# Patient Record
Sex: MALE | Race: ASIAN
[De-identification: names, ages, dates, MRNs, and addresses within clinical notes are randomized per-mention and may not be internally consistent; named-entity substitution may affect disease eponyms.]

---

## 2021-09-27 ENCOUNTER — HOSPITAL ENCOUNTER (OUTPATIENT)
Dept: HOSPITAL 76 - EMS | Age: 21
Discharge: ANOTHER HOSPITAL | End: 2021-09-27
Payer: SELF-PAY
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
## 2021-09-27 DIAGNOSIS — S09.90XA: Primary | ICD-10-CM

## 2022-09-12 ENCOUNTER — HOSPITAL ENCOUNTER (OUTPATIENT)
Dept: HOSPITAL 76 - SC | Age: 22
Discharge: HOME | End: 2022-09-12
Attending: INTERNAL MEDICINE
Payer: COMMERCIAL

## 2022-09-12 VITALS — DIASTOLIC BLOOD PRESSURE: 68 MMHG | SYSTOLIC BLOOD PRESSURE: 118 MMHG

## 2022-09-12 DIAGNOSIS — G47.10: ICD-10-CM

## 2022-09-12 DIAGNOSIS — R06.83: Primary | ICD-10-CM

## 2022-09-12 DIAGNOSIS — R41.89: ICD-10-CM

## 2022-09-12 DIAGNOSIS — G47.00: ICD-10-CM

## 2022-09-12 DIAGNOSIS — G47.8: ICD-10-CM

## 2022-09-12 PROCEDURE — 99202 OFFICE O/P NEW SF 15 MIN: CPT

## 2022-09-12 PROCEDURE — 99212 OFFICE O/P EST SF 10 MIN: CPT

## 2022-09-12 NOTE — SLEEP CARE CONSULTATION
Information from patient questionnaire entered by Roxana Castanon.





I have reviewed and concur with the information entered by Roxana Castanon. This 

document represents the service I personally performed and the decisions made by

me, Yash Gupta MD, [facility].





History of Present Illness


Service Date and Time: 09/12/2022    1332


Reason for Visit: New patient


Chief Complaint: reports: Insomnia, Unrefreshed sleep, Snoring, Excessive 

daytime sleepiness, Frequent awakenings at night


Date of Onset: PAST 2 MONTHS 


Usual bedtime: 11PM


Time it takes to fall asleep: 15 MINUTES


Snores at night: Yes


Sleeps alone due to snoring: No


Number of times waking at night: 2-3


Reasons for waking at night: reports: Choking, Snoring, Other (UNKNOWN REASON)


Toss, Turn, or Twitch while sleeping: Yes


Recalls having dreams: No


Usually gets out of bed at: 730


Feels refreshed in the morning: No


Morning headache: No


Sleepy or fatigued during the day: Yes


Ever fallen asleep while driving: No


Takes day naps: No


Dreams during day naps: No


Prior sleep studies: No


Additional HPI information: 


I had the pleasure of seeing Mr. Cates today regarding the possibility of 

him having a sleep disorder.  As you know, he is a 21-year-old gentleman who 

complains of insomnia involving frequent awakenings during the night.  The 

patient tells me that he normally goes to bed around 11 pm, and it takes him 

approximately 15 minutes to fall asleep.  He has been told that he snores loudly

and irregularly at night.  He has never been observed to stop breathing in his 

sleep.  His bed partner can still sleep in the same bed.  He can recall waking 

up on the average of 2 - 3 times during the night.  Most of the time he wakes up

because of his own snoring, choking, and having to gasp for air.  There is a lot

of tossing and turning in his sleep.  He has somniloquy (sleep talking) but not 

somnambulism (sleep walking).  Generally, there is no recollection of dreams.  

In the morning he usually gets up out of the bed around 7:30 a.m. not feeling 

refreshed nor rested.  He usually does not have a morning headache.  During the 

day he complains of feeling occasionally sleepy and fatigued.  His score on 

[place] Sleepiness Scale is 10 out of 24.  He never has fallen asleep while 

driving nor has had any accident due to sleepiness.  He usually does not take 

naps during the day.  Upon falling asleep during the day he denies having vivid 

dreams.  He has never had sleep paralysis, experienced cataplexy or symptoms of 

restless leg syndrome.  He reports having impaired concentration during the day.








- Parasomnia Symptoms


Ever been unable to move upon waking from sleep: Yes


Walks in sleep: No


Talks in sleep: Yes


Ever acted out dreams in sleep: Yes


Ever felt weak in the knees when startled or emotional: No


Problems with memory or concentration: Yes





Subjective


Initial [place] Sleepiness Scale score: 10 (9/12/22)





Social History


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 Patient is Single and lives in . 





Have you smoked in the past 12 months: No


Alcohol use: Yes


Alcohol amount and frequency: 2 DRINKS A MONTH


Caffeine use: Yes


Caffeine amount and frequency: 1 DRINK FIVE DAYS A WEEK 





Family History


Family history of sleep disordered breathing: Yes


Family Hx Sleep Apnea: Father: Snoring





Allergies and Home Medications


Drug allergies reviewed: Yes


Home medication list reviewed: Yes





Review of Systems


Cardiovascular: denies: high blood pressure, palpitations, chest pain, irregular

heart rate or pulse, leg or foot swelling, have to sleep sitting up, other


Respiratory: denies: shortness of breath, wheeze, sputum production, chronic 

cough, other


Gastrointestinal: denies: heartburn, difficulty swallowing, nausea, vomitting, 

diarrhea, abdominal pain, other


Urinary: denies: incontinence, frequency, urgency, impotence, other


Neurological: reports: headaches


Psychiatric: denies: Attention Deficit Hyperactivity, anxiety, depression, mood 

disorder, claustrophobia, other


Ear/Nose/Throat: reports: nasal congestion, sinus problems


Endocrine: denies: thyroid disease, history of goiter, sluggishness, too hot or 

cold, excessive thirst, increased appetite, increased urination, unexplained 

weakness, other


Musculoskeletal: reports: neck pain, back pain


Immunologic: reports: sneezing





Physical Exam


Vital signs obtained and entered by: BLACK JAEGER


Blood Pressure: 118/68 (RIGHT ARM )


Cuff size: regular


Heart Rate: 86


O2 Saturation: 98


Height: 5 ft 6 in


Weight: 146 lb


Body Mass Index: 23.6


BMI Classification: Healthy weight


Neck circumference: 14 (INCHES)


Mood/affect: normal


HEENT: No craniofacial malformation


Nostrils: patent to airflow


Turbinates: normal


Septum: midline


Mouth and throat: narrow oropharynx


Soft palate: normal


Hard palate: normal


Uvula: normal


Uvula visualization: 100% Mallampati Class I


Tongue: normal in size


Tonsils: 2+


Chin and jaw: normal size and position


Neck: normal w/o lymphadenopathy or thyromegaly


Heart: regular rate and rhythm


Lungs: clear bilaterally


Extremities: no edema or clubbing


Neurologic: intact, no focal deficits





Impression and Plan


IMPRESSION: 1. Obstructive Sleep Apnea-Hypopnea Syndrome, as suggested by 

history of loud and irregular snoring, frequent awakenings during the night, 

nocturnal choking, unrefreshed sleep, and daytime hypersomnolence.  His enlarged

tonsils are a predisposing factor.  I recommend proceeding to polysomnography to

confirm the diagnosis and to assess severity.  I informed the patient of what 

the sleep studies involve and after some discussion, he agreed to proceed. 





Plan:  1. Schedule polysomnography and return in 1 to 2 weeks after the study to

discuss result and initiate therapy.


   2. Avoid long distance driving or when feeling sleepy.


   3. Avoid alcohol, sedative and muscle relaxant around bedtime.





Follow up with Sleep Care in: 1-2 months


Visit Type: In Office


Time Spent with Patient (minutes): 20


Provider Statement: I spent 100% of the Face to Face Visit with the patient with

greater than 50% spent counseling the patient and coordination of care.

## 2022-09-24 ENCOUNTER — HOSPITAL ENCOUNTER (OUTPATIENT)
Dept: HOSPITAL 76 - SC | Age: 22
Discharge: HOME | End: 2022-09-24
Attending: INTERNAL MEDICINE
Payer: COMMERCIAL

## 2022-09-24 DIAGNOSIS — G47.8: Primary | ICD-10-CM

## 2022-09-24 PROCEDURE — 95810 POLYSOM 6/> YRS 4/> PARAM: CPT

## 2022-11-28 ENCOUNTER — HOSPITAL ENCOUNTER (OUTPATIENT)
Dept: HOSPITAL 76 - SC | Age: 22
Discharge: HOME | End: 2022-11-28
Attending: INTERNAL MEDICINE
Payer: COMMERCIAL

## 2022-11-28 VITALS — DIASTOLIC BLOOD PRESSURE: 74 MMHG | SYSTOLIC BLOOD PRESSURE: 110 MMHG

## 2022-11-28 DIAGNOSIS — G47.8: Primary | ICD-10-CM

## 2022-11-28 PROCEDURE — 99212 OFFICE O/P EST SF 10 MIN: CPT

## 2022-11-28 NOTE — SLEEP CARE CONSULTATION
Information from patient questionnaire entered by Andre Cintron.





I have reviewed and concur with the information entered by Andre Cintron. This 

document represents the service I personally performed and the decisions made by

me, Yash Gupta MD, [facility].





History of Present Illness


Service Date and Time: 11/28/2022    1129


Initial [place] Sleepiness Scale score: 10 (9/12/22)


Current [place] Sleepiness Scale score: 11 (11/28/2022)


Additional HPI information: 


Mr. Cates returned for follow up of the sleep study he had on 9-24-22.  The 

polysomnography showed that the patient had normal sleep efficiency. The sleep 

architecture was relatively normal as well considering the first night effect. 

Respiratory monitoring showed evidence of upper airway resistance (AHI = 1.0 and

= 6.1) and no hypoxia (zeynep oxygen saturation of 92%). The respiratory slept  

mostly supine (supine AHI = 1.2; non-supine = 0.00). Snore was moderate in 

intensity. There was no significant periodic leg movement of sleep. Cardiac 

rhythm was normal sinus rhythm without significant arrhythmia. No abnormal 

behavior (parasomnia) observed during the night.





The patient was informed of these findings.  I explained to him that he does not

have obstructive sleep apnea-hypopnea but there is some evidence of increased 

upper airway resistance at night.  








Sleep Study





- Results


Type of Sleep Study: Polysomnography (COMPLETED 09/24/2022)


Prior sleep studies: No





Allergies and Home Medications


Drug allergies reviewed: Yes


Home medication list reviewed: Yes





Review of Systems


Review of systems same as previous: Yes





Physical Exam


Vital signs obtained and entered by: ANDRE ABRAHAM MA


Blood Pressure: 110/74 (LEFT ARM)


Cuff size: regular


Heart Rate: 61


O2 Saturation: 98


Height: 5 ft 6 in


Weight: 155 lb 9.6 oz


Body Mass Index: 25.1


BMI Classification: Overweight





Impression and Plan


IMPRESSION: 1. Upper airway resistance syndrome, possibly causing of the 

nocturnal choking, unrefreshed sleep, and excessive daytime sleepiness. The 

patient would like to try the CPAP therapy which is reasonable.   I will order 

him an autoCPAP set between 4 and 12 cmH2O.





PLAN:     1.   Prescription made for an autoCPAP, heated humidifier, and related

supplies through [facility]. 


2.   Return for follow up after one month of using the CPAP.





Prescriptions: Auto CPAP


Follow up with Sleep Care in: 1-2 months


Visit Type: In Office


Time Spent with Patient (minutes): 15


Provider Statement: I spent 100% of the Face to Face Visit with the patient with

greater than 50% spent counseling the patient and coordination of care.

## 2023-03-13 ENCOUNTER — HOSPITAL ENCOUNTER (OUTPATIENT)
Dept: HOSPITAL 76 - SC | Age: 23
Discharge: HOME | End: 2023-03-13
Attending: INTERNAL MEDICINE
Payer: COMMERCIAL

## 2023-03-13 VITALS — SYSTOLIC BLOOD PRESSURE: 102 MMHG | DIASTOLIC BLOOD PRESSURE: 62 MMHG

## 2023-03-13 DIAGNOSIS — G47.8: Primary | ICD-10-CM

## 2023-03-13 PROCEDURE — 99212 OFFICE O/P EST SF 10 MIN: CPT
